# Patient Record
(demographics unavailable — no encounter records)

---

## 2024-11-05 NOTE — ASSESSMENT
[FreeTextEntry1] : 46 year old female  with a past medical history of hypothyroidism, prediabetes, gestational diabetes who presents for management of her thyroid disease  1. 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 Prediabetes Discussed maintaining a low carbohydrate diet Discussed the benefits of Exercise which includes a minimum to 150 minutes of moderate intensity per week Patient has very strong family history and had gestational diabetes She likely will develop diabetes eventually She is following the right lifestyle. I encouraged to continue Cont Metformin ER 1000 mg QD

## 2024-11-05 NOTE — HISTORY OF PRESENT ILLNESS
[FreeTextEntry1] : Ms. LLOYD MCNEIL is a 46 year old female  with a past medical history of hypothyroidism, prediabetes, gestational diabetes who presents for management of her thyroid disease. Patient was first diagnosed during her IVF treatments. She has been on it for 13 years. She takes levothyroxine 75 mcg QD. Patient takes it in an empty stomach 30-60 mins before breakfast in the morning. Patient's father has hypothyroidism and diabetes. Patients mother and sister has diabetes. Patient has had struggles with her blood sugars. She is on Metformin ER 1000 mg QD. She eats well and exercises with strength and cardio. She was initially on Trulicity but insurance stopped covering it. She has been consistently exercising and has even increased it but has been gaining weight.

## 2025-03-20 NOTE — HISTORY OF PRESENT ILLNESS
[FreeTextEntry1] : Ms. LLOYD MCNEIL is a 46 year old female  with a past medical history of hypothyroidism, prediabetes, gestational diabetes who presents for management of her thyroid disease. She was started on Wegovy due to further weight gain. Her sugar and cholesterol went up. She has a leaky valve and it got worse due to increased blood pressure. She lost 10-15 lbs.

## 2025-03-20 NOTE — PHYSICAL EXAM
[Alert] : alert [Well Nourished] : well nourished [No Acute Distress] : no acute distress [Well Developed] : well developed [Normal Sclera/Conjunctiva] : normal sclera/conjunctiva [EOMI] : extra ocular movement intact [No Proptosis] : no proptosis [Normal Oropharynx] : the oropharynx was normal [Thyroid Not Enlarged] : the thyroid was not enlarged [No Thyroid Nodules] : no palpable thyroid nodules [No Respiratory Distress] : no respiratory distress [No Accessory Muscle Use] : no accessory muscle use [Clear to Auscultation] : lungs were clear to auscultation bilaterally [Normal S1, S2] : normal S1 and S2 [Normal Rate] : heart rate was normal [Regular Rhythm] : with a regular rhythm [No Edema] : no peripheral edema [Pedal Pulses Normal] : the pedal pulses are present [Normal Bowel Sounds] : normal bowel sounds [Not Tender] : non-tender [Not Distended] : not distended [Soft] : abdomen soft normal bilaterally [Normal Anterior Cervical Nodes] : no anterior cervical lymphadenopathy [No Spinal Tenderness] : no spinal tenderness [Spine Straight] : spine straight [No Stigmata of Cushings Syndrome] : no stigmata of Cushings Syndrome [Normal Strength/Tone] : muscle strength and tone were normal [Normal Gait] : normal gait [No Rash] : no rash [Normal Reflexes] : deep tendon reflexes were 2+ and symmetric [No Tremors] : no tremors [Oriented x3] : oriented to person, place, and time [Acanthosis Nigricans] : no acanthosis nigricans

## 2025-03-20 NOTE — ASSESSMENT
[FreeTextEntry1] : 46 year old female  with a past medical history of hypothyroidism, prediabetes, gestational diabetes who presents for management of her thyroid disease  1. 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 Prediabetes Discussed maintaining a low carbohydrate diet Discussed the benefits of Exercise which includes a minimum to 150 minutes of moderate intensity per week Patient has very strong family history and had gestational diabetes Expect her A1C to come down May not need metformin Will check levels today Cont Metformin ER 1000 mg QD